# Patient Record
Sex: MALE | Race: WHITE | URBAN - METROPOLITAN AREA
[De-identification: names, ages, dates, MRNs, and addresses within clinical notes are randomized per-mention and may not be internally consistent; named-entity substitution may affect disease eponyms.]

---

## 2017-01-13 ENCOUNTER — OFFICE VISIT (OUTPATIENT)
Dept: FAMILY MEDICINE | Facility: CLINIC | Age: 27
End: 2017-01-13
Payer: COMMERCIAL

## 2017-01-13 VITALS
SYSTOLIC BLOOD PRESSURE: 137 MMHG | TEMPERATURE: 98.2 F | DIASTOLIC BLOOD PRESSURE: 81 MMHG | HEIGHT: 71 IN | HEART RATE: 86 BPM | OXYGEN SATURATION: 100 % | WEIGHT: 178 LBS | BODY MASS INDEX: 24.92 KG/M2

## 2017-01-13 DIAGNOSIS — L42 PITYRIASIS ROSEA: Primary | ICD-10-CM

## 2017-01-13 PROCEDURE — 99201 ZZC OFFICE/OUTPT VISIT, NEW, LEVEL I: CPT | Performed by: PHYSICIAN ASSISTANT

## 2017-01-13 NOTE — NURSING NOTE
"Chief Complaint   Patient presents with     Derm Problem       Initial /81 mmHg  Pulse 86  Temp(Src) 98.2  F (36.8  C) (Oral)  Ht 5' 11\" (1.803 m)  Wt 178 lb (80.74 kg)  BMI 24.84 kg/m2  SpO2 100% Estimated body mass index is 24.84 kg/(m^2) as calculated from the following:    Height as of this encounter: 5' 11\" (1.803 m).    Weight as of this encounter: 178 lb (80.74 kg).  BP completed using cuff size: josefina García MA      "

## 2017-01-13 NOTE — PROGRESS NOTES
"  SUBJECTIVE:                                                    Cisco Vuong is a 26 year old male who presents to clinic today for the following health issues:    Rash     Onset: x1 week     Description:   Location: torso - spreading, not below knee or forearm   Character: red round  Itching (Pruritis): no     Progression of Symptoms:  Was getting worse now staying the same     Accompanying Signs & Symptoms:  Fever: no   Body aches or joint pain: no   Sore throat symptoms: no   Recent cold symptoms: no    History:   Previous similar rash: no     Precipitating factors:   Exposure to similar rash: no   New exposures: None   Recent travel: no     Alleviating factors:  None      Therapies Tried and outcome: antifungal cream, antihistamines - no relief         Problem list and histories reviewed & adjusted, as indicated.  Additional history: as documented    There is no problem list on file for this patient.    History reviewed. No pertinent past surgical history.    Social History   Substance Use Topics     Smoking status: Current Every Day Smoker -- 1.00 packs/day     Types: Cigarettes     Smokeless tobacco: Never Used     Alcohol Use: No     History reviewed. No pertinent family history.        ROS:  Constitutional, skin systems are negative, except as otherwise noted.    OBJECTIVE:                                                    /81 mmHg  Pulse 86  Temp(Src) 98.2  F (36.8  C) (Oral)  Ht 5' 11\" (1.803 m)  Wt 178 lb (80.74 kg)  BMI 24.84 kg/m2  SpO2 100%  Body mass index is 24.84 kg/(m^2).  GENERAL APPEARANCE: healthy, alert and no distress  SKIN: mildly erythematous papules and plaques noted of torso and extremities - follow skin lines, slightly scaly, some with raised borders. Large plaque on left leg       ASSESSMENT:                                                      1. Pityriasis rosea         PLAN:                                                      Patient Instructions     Pityriasis " Rosea  This is a harmless non-contagious rash. The exact cause is unknown. It is not an allergic reaction, and does not seem to be caused by a viral or fungal infection. Although anyone can get it, it is most often seen in children and young adults ages 10 to 35.  Signs and Symptoms  The following are signs and symptoms of pityriasis rosea:    It usually starts with a single oval spot called a  herald  patch on the trunk or back.    It is often brielle or pink colored, but can vary depending on your normal coloring, making it darker or browner.    After about 1 to 2 weeks, other patches appear on the chest, back, arms, legs, and neck.    Patches are usually oval shaped and flat. They can make a  [place] tree  pattern on your back or chest.    Itching is common, and may get worse from irritants like hot water or soap.  Sweating from exercise can also increase itching.    You may feel like you are getting a cold; being tired or achy.  Course  Pityriasis usually resolves on its own without treatment in 2 weeks.  In some people it may take 6 to 8 weeks to clear up.   Home care  The following guidelines will help you care for yourself at home:  1. For dry skin, use a moisturizing cream. For itchiness, use 1% hydrocortisone cream (no prescription needed) or calamine lotion 2 to 3 times a day.  2. Exposure to natural sunlight helps some people. It may help fade the rash, but doesn't seem to help the itching. Don't overdo it, as your skin may be more sensitive than usual. You do not want to burn yourself. Artificial sun lamps, sun beds, and tanning salons do not seem to help, and are not recommended.  3. This condition is not contagious. Your child may attend  or school while the rash is present.  Follow-up care  Follow up with your doctor if the itching is not controlled by the above suggestions or if the rash lasts longer than 12 weeks (3 months).  Medications  Talk with your health care provider before using any  medications, as everything has side effects.    Medications will not get rid of the rash.     Moisturizing skin creams may help.    Antihistamines may help with itching, but they can make you sleepy.    Topical steroids are sometimes used.  When to seek medical care  Get prompt medical attention if any of the following occur:    If you develop a rash and are pregnant    Severe itching    Signs of infection in the skin (increasing redness, drainage of pus, yellow-brown scabs)    Fever or other symptoms of a new illness    2492-5511 [employer]. [address] [place] [address]. All rights reserved. This information is not intended as a substitute for professional medical care. Always follow your healthcare professional's instructions.             Carolyne Rayo PA-C  [facility]

## 2017-01-13 NOTE — MR AVS SNAPSHOT
After Visit Summary   1/13/2017    Cisco Vuong    MRN: [id number]           Patient Information     Date Of Birth          1990        Visit Information        Provider Department      1/13/2017 11:20 AM Carolyne Rayo PA-C [facility]        Today's Diagnoses     Pityriasis rosea    -  1       Care Instructions      Pityriasis Rosea  This is a harmless non-contagious rash. The exact cause is unknown. It is not an allergic reaction, and does not seem to be caused by a viral or fungal infection. Although anyone can get it, it is most often seen in children and young adults ages 10 to 35.  Signs and Symptoms  The following are signs and symptoms of pityriasis rosea:    It usually starts with a single oval spot called a  herald  patch on the trunk or back.    It is often brielle or pink colored, but can vary depending on your normal coloring, making it darker or browner.    After about 1 to 2 weeks, other patches appear on the chest, back, arms, legs, and neck.    Patches are usually oval shaped and flat. They can make a  [place] tree  pattern on your back or chest.    Itching is common, and may get worse from irritants like hot water or soap.  Sweating from exercise can also increase itching.    You may feel like you are getting a cold; being tired or achy.  Course  Pityriasis usually resolves on its own without treatment in 2 weeks.  In some people it may take 6 to 8 weeks to clear up.   Home care  The following guidelines will help you care for yourself at home:  1. For dry skin, use a moisturizing cream. For itchiness, use 1% hydrocortisone cream (no prescription needed) or calamine lotion 2 to 3 times a day.  2. Exposure to natural sunlight helps some people. It may help fade the rash, but doesn't seem to help the itching. Don't overdo it, as your skin may be more sensitive than usual. You do not want to burn yourself. Artificial sun lamps, sun beds, and tanning salons do  not seem to help, and are not recommended.  3. This condition is not contagious. Your child may attend  or school while the rash is present.  Follow-up care  Follow up with your doctor if the itching is not controlled by the above suggestions or if the rash lasts longer than 12 weeks (3 months).  Medications  Talk with your health care provider before using any medications, as everything has side effects.    Medications will not get rid of the rash.     Moisturizing skin creams may help.    Antihistamines may help with itching, but they can make you sleepy.    Topical steroids are sometimes used.  When to seek medical care  Get prompt medical attention if any of the following occur:    If you develop a rash and are pregnant    Severe itching    Signs of infection in the skin (increasing redness, drainage of pus, yellow-brown scabs)    Fever or other symptoms of a new illness    8118-4861 [employer]. [address] [place] [address]. All rights reserved. This information is not intended as a substitute for professional medical care. Always follow your healthcare professional's instructions.              Follow-ups after your visit        Who to contact     Normal or non-critical lab and imaging results will be communicated to you by Moobiahart, letter or phone within 4 business days after the clinic has received the results. If you do not hear from us within 7 days, please contact the clinic through Moobiahart or phone. If you have a critical or abnormal lab result, we will notify you by phone as soon as possible.  Submit refill requests through Tractive or call your pharmacy and they will forward the refill request to us. Please allow 3 business days for your refill to be completed.          If you need to speak with a  for additional information , please call: [phone]             Additional Information About Your Visit        Tractive Information     Tractive lets you  "send messages to your doctor, view your test results, renew your prescriptions, schedule appointments and more. To sign up, go to [email or web address] . Click on \"Log in\" on the left side of the screen, which will take you to the Welcome page. Then click on \"Sign up Now\" on the right side of the page.     You will be asked to enter the access code listed below, as well as some personal information. Please follow the directions to create your username and password.     Your access code is: [email or web address]  Expires: 2017 11:44 AM     Your access code will  in 90 days. If you need help or a new code, please call your [facility] clinic or [phone].        Care EveryWhere ID     This is your [facility] EveryWhere ID. This could be used by other organizations to access your [facility] medical records  [id number]        Your Vitals Were     Pulse Temperature Height BMI (Body Mass Index) Pulse Oximetry       86 98.2  F (36.8  C) (Oral) 5' 11\" (1.803 m) 24.84 kg/m2 100%        Blood Pressure from Last 3 Encounters:   17 137/81    Weight from Last 3 Encounters:   17 178 lb (80.74 kg)              Today, you had the following     No orders found for display       Primary Care Provider Office Phone #    Landen Cunningham [facility] [phone]       No address on file        Thank you!     Thank you for choosing [facility]  for your care. Our goal is always to provide you with excellent care. Hearing back from our patients is one way we can continue to improve our services. Please take a few minutes to complete the written survey that you may receive in the mail after your visit with us. Thank you!             Your Updated Medication List - Protect others around you: Learn how to safely use, store and throw away your medicines at www.disposemymeds.org.      Notice  As of 2017 11:44 AM    You have not been prescribed any medications.      "

## 2017-01-13 NOTE — PATIENT INSTRUCTIONS
Pityriasis Rosea  This is a harmless non-contagious rash. The exact cause is unknown. It is not an allergic reaction, and does not seem to be caused by a viral or fungal infection. Although anyone can get it, it is most often seen in children and young adults ages 10 to 35.  Signs and Symptoms  The following are signs and symptoms of pityriasis rosea:    It usually starts with a single oval spot called a  herald  patch on the trunk or back.    It is often brielle or pink colored, but can vary depending on your normal coloring, making it darker or browner.    After about 1 to 2 weeks, other patches appear on the chest, back, arms, legs, and neck.    Patches are usually oval shaped and flat. They can make a  [place] tree  pattern on your back or chest.    Itching is common, and may get worse from irritants like hot water or soap.  Sweating from exercise can also increase itching.    You may feel like you are getting a cold; being tired or achy.  Course  Pityriasis usually resolves on its own without treatment in 2 weeks.  In some people it may take 6 to 8 weeks to clear up.   Home care  The following guidelines will help you care for yourself at home:  1. For dry skin, use a moisturizing cream. For itchiness, use 1% hydrocortisone cream (no prescription needed) or calamine lotion 2 to 3 times a day.  2. Exposure to natural sunlight helps some people. It may help fade the rash, but doesn't seem to help the itching. Don't overdo it, as your skin may be more sensitive than usual. You do not want to burn yourself. Artificial sun lamps, sun beds, and tanning salons do not seem to help, and are not recommended.  3. This condition is not contagious. Your child may attend  or school while the rash is present.  Follow-up care  Follow up with your doctor if the itching is not controlled by the above suggestions or if the rash lasts longer than 12 weeks (3 months).  Medications  Talk with your health care provider before  using any medications, as everything has side effects.    Medications will not get rid of the rash.     Moisturizing skin creams may help.    Antihistamines may help with itching, but they can make you sleepy.    Topical steroids are sometimes used.  When to seek medical care  Get prompt medical attention if any of the following occur:    If you develop a rash and are pregnant    Severe itching    Signs of infection in the skin (increasing redness, drainage of pus, yellow-brown scabs)    Fever or other symptoms of a new illness    7446-9967 [employer]. [address] [place] [address]. All rights reserved. This information is not intended as a substitute for professional medical care. Always follow your healthcare professional's instructions.